# Patient Record
Sex: MALE | Race: WHITE | URBAN - METROPOLITAN AREA
[De-identification: names, ages, dates, MRNs, and addresses within clinical notes are randomized per-mention and may not be internally consistent; named-entity substitution may affect disease eponyms.]

---

## 2024-11-06 ENCOUNTER — HOSPITAL ENCOUNTER (EMERGENCY)
Age: 69
Discharge: HOME | End: 2024-11-07
Attending: EMERGENCY MEDICINE
Payer: COMMERCIAL

## 2024-11-06 DIAGNOSIS — T18.108A FOREIGN BODY IN ESOPHAGUS, INITIAL ENCOUNTER: Primary | ICD-10-CM

## 2024-11-06 PROCEDURE — 96372 THER/PROPH/DIAG INJ SC/IM: CPT

## 2024-11-06 PROCEDURE — 99283 EMERGENCY DEPT VISIT LOW MDM: CPT

## 2024-11-06 PROCEDURE — 6360000002 HC RX W HCPCS: Performed by: EMERGENCY MEDICINE

## 2024-11-06 RX ORDER — GLUCAGON 1 MG/ML
1 KIT INJECTION ONCE
Status: COMPLETED | OUTPATIENT
Start: 2024-11-07 | End: 2024-11-06

## 2024-11-06 RX ADMIN — GLUCAGON 1 MG: 1 INJECTION, POWDER, LYOPHILIZED, FOR SOLUTION INTRAMUSCULAR; INTRAVENOUS at 23:57

## 2024-11-06 ASSESSMENT — PAIN - FUNCTIONAL ASSESSMENT: PAIN_FUNCTIONAL_ASSESSMENT: NONE - DENIES PAIN

## 2024-11-07 VITALS
RESPIRATION RATE: 24 BRPM | HEART RATE: 77 BPM | SYSTOLIC BLOOD PRESSURE: 152 MMHG | WEIGHT: 225 LBS | TEMPERATURE: 97.7 F | OXYGEN SATURATION: 97 % | HEIGHT: 70 IN | BODY MASS INDEX: 32.21 KG/M2 | DIASTOLIC BLOOD PRESSURE: 86 MMHG

## 2024-11-07 ASSESSMENT — ENCOUNTER SYMPTOMS: TROUBLE SWALLOWING: 1

## 2024-11-07 NOTE — ED NOTES
On call GI in. Pt instructed to follow up with GI office to get EGD scope scheduled. Does not need ER procedure at this time.

## 2024-11-07 NOTE — ED PROVIDER NOTES
[facility]  EMERGENCY DEPARTMENT ENCOUNTER      Pt Name: Devonte Aragon  MRN: [id number]  Birthdate 1955  Date of evaluation: 11/6/2024  Provider: Renita Garcia MD    CHIEF COMPLAINT       Chief Complaint   Patient presents with    Airway Obstruction     Pt states about 30 minutes ago he was eating pork and states it feels lodged in throat      HISTORY OF PRESENT ILLNESS  (Location/Symptom, Timing/Onset, Context/Setting, Quality, Duration, Modifying Factors, Severity.)   Devonte Aragon is a 69 y.o. male who presents to the emergency department complaining of airway obstruction.  He said about 30 minutes prior to arrival he was eating a piece of pork and states it is feeling lodged in his throat now.  He has not been able to swallow his own secretions since that time.  He does tell me he has had esophageal dysphagia previously.  He is not currently taking anything as far as medications.    Nursing Notes were reviewed.    REVIEW OF SYSTEMS    (2-9 systems for level 4, 10 or more for level 5)     Review of Systems   HENT:  Positive for trouble swallowing.    All other systems reviewed and are negative.      Except as noted above the remainder of the review of systems was reviewed and negative.       PAST MEDICAL HISTORY   No past medical history on file.    SURGICAL HISTORY     No past surgical history on file.    CURRENT MEDICATIONS       Previous Medications    No medications on file       ALLERGIES     Patient has no known allergies.    FAMILY HISTORY     No family history on file.  No family status information on file.        SOCIAL HISTORY          PHYSICAL EXAM    (up to 7 for level 4, 8 or more for level 5)     ED Triage Vitals [11/06/24 2351]   BP Systolic BP Percentile Diastolic BP Percentile Temp Temp Source Pulse Respirations SpO2   (!) 192/116 -- -- 97.7 °F (36.5 °C) Oral 78 24 99 %      Height Weight - Scale         1.778 m (5' 10\") 102.1 kg (225 lb)           Physical Exam  Vitals and nursing

## 2024-11-07 NOTE — ED PROVIDER NOTES
EMERGENCY DEPARTMENT ENCOUNTER    Pt Name: Devonte Aragon  MRN: [id number]  Birthdate 1955  Date of evaluation: 11/7/24  CHIEF COMPLAINT       Chief Complaint   Patient presents with    Swallowed Foreign Body     HISTORY OF PRESENT ILLNESS   69-year-old male presents to the emergency room as a transfer from [facility] for esophageal foreign body.  Patient had been reportedly eating tonight when it felt like something got stuck.  They had tried glucagon at the other facility with little success.  Patient was transported here to the emergency room for evaluation with GI specialist.  By the time Dr. Almazan GI specialist arrived patient reported feeling something sliding down.  He is not able to tolerate water and does not have the discomfort.             REVIEW OF SYSTEMS     Review of Systems  PASTMEDICAL HISTORY     Past Medical History:   Diagnosis Date    Hypertension      Past Problem List  There is no problem list on file for this patient.    SURGICAL HISTORY     No past surgical history on file.  CURRENT MEDICATIONS       Previous Medications    No medications on file     ALLERGIES     has No Known Allergies.  FAMILY HISTORY     has no family status information on file.      SOCIAL HISTORY        PHYSICAL EXAM     INITIAL VITALS: BP (!) 152/86   Pulse 77   Temp 97.7 °F (36.5 °C) (Oral)   Resp 24   Ht 1.778 m (5' 10\")   Wt 102.1 kg (225 lb)   SpO2 97%   BMI 32.28 kg/m²    Physical Exam  Constitutional:       General: He is not in acute distress.     Appearance: He is well-developed.   HENT:      Head: Normocephalic.   Eyes:      Pupils: Pupils are equal, round, and reactive to light.   Cardiovascular:      Rate and Rhythm: Normal rate.   Pulmonary:      Effort: Pulmonary effort is normal.   Skin:     General: Skin is warm and dry.   Neurological:      Mental Status: He is alert and oriented to person, place, and time.         MEDICAL DECISION MAKING / ED COURSE:   Summary of Patient Presentation:

## 2024-11-07 NOTE — ED NOTES
In from [facility]. Pt reports something being stuck in his throat. Pt spitting mucous into bag. Dr calls for update. Pt holds neck and says \"I think it may have went down\" Pt reports complications with swallowing in past. Going on for about 3 years. PCP wanted swallow study/scope completed and pt has not completed yet. Pt updated on going to OR. Pt up to bathroom.